# Patient Record
Sex: MALE | Race: WHITE
[De-identification: names, ages, dates, MRNs, and addresses within clinical notes are randomized per-mention and may not be internally consistent; named-entity substitution may affect disease eponyms.]

---

## 2018-04-26 ENCOUNTER — HOSPITAL ENCOUNTER (INPATIENT)
Dept: HOSPITAL 72 - EMR | Age: 83
LOS: 2 days | Discharge: ANOTHER HOSPITAL | DRG: 280 | End: 2018-04-28
Payer: MEDICARE

## 2018-04-26 VITALS — SYSTOLIC BLOOD PRESSURE: 104 MMHG | DIASTOLIC BLOOD PRESSURE: 68 MMHG

## 2018-04-26 VITALS — SYSTOLIC BLOOD PRESSURE: 99 MMHG | DIASTOLIC BLOOD PRESSURE: 58 MMHG

## 2018-04-26 VITALS — SYSTOLIC BLOOD PRESSURE: 97 MMHG | DIASTOLIC BLOOD PRESSURE: 61 MMHG

## 2018-04-26 VITALS — DIASTOLIC BLOOD PRESSURE: 62 MMHG | SYSTOLIC BLOOD PRESSURE: 115 MMHG

## 2018-04-26 VITALS — DIASTOLIC BLOOD PRESSURE: 61 MMHG | SYSTOLIC BLOOD PRESSURE: 101 MMHG

## 2018-04-26 VITALS — WEIGHT: 153 LBS | HEIGHT: 67 IN | BODY MASS INDEX: 24.01 KG/M2

## 2018-04-26 VITALS — SYSTOLIC BLOOD PRESSURE: 95 MMHG | DIASTOLIC BLOOD PRESSURE: 81 MMHG

## 2018-04-26 DIAGNOSIS — I45.10: ICD-10-CM

## 2018-04-26 DIAGNOSIS — I25.5: ICD-10-CM

## 2018-04-26 DIAGNOSIS — I21.9: Primary | ICD-10-CM

## 2018-04-26 DIAGNOSIS — I50.43: ICD-10-CM

## 2018-04-26 DIAGNOSIS — I13.0: ICD-10-CM

## 2018-04-26 DIAGNOSIS — Z88.0: ICD-10-CM

## 2018-04-26 DIAGNOSIS — I25.10: ICD-10-CM

## 2018-04-26 DIAGNOSIS — E44.0: ICD-10-CM

## 2018-04-26 DIAGNOSIS — J96.00: ICD-10-CM

## 2018-04-26 DIAGNOSIS — I25.2: ICD-10-CM

## 2018-04-26 DIAGNOSIS — T50.8X5A: ICD-10-CM

## 2018-04-26 DIAGNOSIS — G30.9: ICD-10-CM

## 2018-04-26 DIAGNOSIS — R57.0: ICD-10-CM

## 2018-04-26 DIAGNOSIS — F02.80: ICD-10-CM

## 2018-04-26 DIAGNOSIS — I48.0: ICD-10-CM

## 2018-04-26 DIAGNOSIS — K72.00: ICD-10-CM

## 2018-04-26 DIAGNOSIS — J18.9: ICD-10-CM

## 2018-04-26 DIAGNOSIS — N17.9: ICD-10-CM

## 2018-04-26 DIAGNOSIS — N18.5: ICD-10-CM

## 2018-04-26 LAB
ADD MANUAL DIFF: YES
ALBUMIN SERPL-MCNC: 3 G/DL (ref 3.4–5)
ALBUMIN/GLOB SERPL: 0.9 {RATIO} (ref 1–2.7)
ALP SERPL-CCNC: 200 U/L (ref 46–116)
ALT SERPL-CCNC: 365 U/L (ref 12–78)
ANION GAP SERPL CALC-SCNC: 20 MMOL/L (ref 5–15)
APPEARANCE UR: (no result)
APTT BLD: 29 SEC (ref 23–33)
APTT PPP: (no result) S
AST SERPL-CCNC: 670 U/L (ref 15–37)
BILIRUB SERPL-MCNC: 0.8 MG/DL (ref 0.2–1)
BUN SERPL-MCNC: 84 MG/DL (ref 7–18)
CALCIUM SERPL-MCNC: 8.7 MG/DL (ref 8.5–10.1)
CHLORIDE SERPL-SCNC: 96 MMOL/L (ref 98–107)
CK MB SERPL-MCNC: 16.2 NG/ML (ref 0–3.6)
CK SERPL-CCNC: 327 U/L (ref 26–308)
CO2 SERPL-SCNC: 17 MMOL/L (ref 21–32)
CREAT SERPL-MCNC: 3.6 MG/DL (ref 0.55–1.3)
ERYTHROCYTE [DISTWIDTH] IN BLOOD BY AUTOMATED COUNT: 15.1 % (ref 11.6–14.8)
GLOBULIN SER-MCNC: 3.4 G/DL
GLUCOSE UR STRIP-MCNC: NEGATIVE MG/DL
HCT VFR BLD CALC: 31.7 % (ref 42–52)
HGB BLD-MCNC: 10.6 G/DL (ref 14.2–18)
INR PPP: 1.3 (ref 0.9–1.1)
KETONES UR QL STRIP: (no result)
LEUKOCYTE ESTERASE UR QL STRIP: (no result)
MCV RBC AUTO: 92 FL (ref 80–99)
NITRITE UR QL STRIP: POSITIVE
PH UR STRIP: 5 [PH] (ref 4.5–8)
PLATELET # BLD: 463 K/UL (ref 150–450)
POTASSIUM SERPL-SCNC: 4.9 MMOL/L (ref 3.5–5.1)
PROT UR QL STRIP: (no result)
RBC # BLD AUTO: 3.45 M/UL (ref 4.7–6.1)
SODIUM SERPL-SCNC: 133 MMOL/L (ref 136–145)
SP GR UR STRIP: 1.02 (ref 1–1.03)
UROBILINOGEN UR-MCNC: 1 MG/DL (ref 0–1)
WBC # BLD AUTO: 17.7 K/UL (ref 4.8–10.8)

## 2018-04-26 PROCEDURE — 84153 ASSAY OF PSA TOTAL: CPT

## 2018-04-26 PROCEDURE — 84484 ASSAY OF TROPONIN QUANT: CPT

## 2018-04-26 PROCEDURE — 5A09457 ASSISTANCE WITH RESPIRATORY VENTILATION, 24-96 CONSECUTIVE HOURS, CONTINUOUS POSITIVE AIRWAY PRESSURE: ICD-10-PCS

## 2018-04-26 PROCEDURE — 84443 ASSAY THYROID STIM HORMONE: CPT

## 2018-04-26 PROCEDURE — 87086 URINE CULTURE/COLONY COUNT: CPT

## 2018-04-26 PROCEDURE — 99291 CRITICAL CARE FIRST HOUR: CPT

## 2018-04-26 PROCEDURE — 82803 BLOOD GASES ANY COMBINATION: CPT

## 2018-04-26 PROCEDURE — 83605 ASSAY OF LACTIC ACID: CPT

## 2018-04-26 PROCEDURE — 85610 PROTHROMBIN TIME: CPT

## 2018-04-26 PROCEDURE — 85025 COMPLETE CBC W/AUTO DIFF WBC: CPT

## 2018-04-26 PROCEDURE — 83036 HEMOGLOBIN GLYCOSYLATED A1C: CPT

## 2018-04-26 PROCEDURE — 93971 EXTREMITY STUDY: CPT

## 2018-04-26 PROCEDURE — 70450 CT HEAD/BRAIN W/O DYE: CPT

## 2018-04-26 PROCEDURE — 80053 COMPREHEN METABOLIC PANEL: CPT

## 2018-04-26 PROCEDURE — 82553 CREATINE MB FRACTION: CPT

## 2018-04-26 PROCEDURE — 85007 BL SMEAR W/DIFF WBC COUNT: CPT

## 2018-04-26 PROCEDURE — 36600 WITHDRAWAL OF ARTERIAL BLOOD: CPT

## 2018-04-26 PROCEDURE — 81003 URINALYSIS AUTO W/O SCOPE: CPT

## 2018-04-26 PROCEDURE — 71045 X-RAY EXAM CHEST 1 VIEW: CPT

## 2018-04-26 PROCEDURE — 84550 ASSAY OF BLOOD/URIC ACID: CPT

## 2018-04-26 PROCEDURE — 83735 ASSAY OF MAGNESIUM: CPT

## 2018-04-26 PROCEDURE — 87081 CULTURE SCREEN ONLY: CPT

## 2018-04-26 PROCEDURE — 82550 ASSAY OF CK (CPK): CPT

## 2018-04-26 PROCEDURE — 84100 ASSAY OF PHOSPHORUS: CPT

## 2018-04-26 PROCEDURE — 80202 ASSAY OF VANCOMYCIN: CPT

## 2018-04-26 PROCEDURE — 36415 COLL VENOUS BLD VENIPUNCTURE: CPT

## 2018-04-26 PROCEDURE — 94664 DEMO&/EVAL PT USE INHALER: CPT

## 2018-04-26 PROCEDURE — 76770 US EXAM ABDO BACK WALL COMP: CPT

## 2018-04-26 PROCEDURE — 94760 N-INVAS EAR/PLS OXIMETRY 1: CPT

## 2018-04-26 PROCEDURE — 82962 GLUCOSE BLOOD TEST: CPT

## 2018-04-26 PROCEDURE — 82575 CREATININE CLEARANCE TEST: CPT

## 2018-04-26 PROCEDURE — 94660 CPAP INITIATION&MGMT: CPT

## 2018-04-26 PROCEDURE — 94640 AIRWAY INHALATION TREATMENT: CPT

## 2018-04-26 PROCEDURE — 83880 ASSAY OF NATRIURETIC PEPTIDE: CPT

## 2018-04-26 PROCEDURE — 87040 BLOOD CULTURE FOR BACTERIA: CPT

## 2018-04-26 PROCEDURE — 93005 ELECTROCARDIOGRAM TRACING: CPT

## 2018-04-26 PROCEDURE — 85730 THROMBOPLASTIN TIME PARTIAL: CPT

## 2018-04-26 RX ADMIN — INSULIN ASPART SCH UNITS: 100 INJECTION, SOLUTION INTRAVENOUS; SUBCUTANEOUS at 21:00

## 2018-04-26 RX ADMIN — IPRATROPIUM BROMIDE AND ALBUTEROL SULFATE SCH ML: .5; 3 SOLUTION RESPIRATORY (INHALATION) at 22:42

## 2018-04-26 RX ADMIN — HEPARIN SODIUM SCH UNITS: 5000 INJECTION INTRAVENOUS; SUBCUTANEOUS at 21:16

## 2018-04-26 NOTE — DIAGNOSTIC IMAGING REPORT
Indication: Shortness of breath

 

Technique: One view of the chest

 

Comparison: none

 

Findings: There is bilateral interstitial and airspace edema versus infiltrates.

There are bilateral pleural effusions. The heart is mildly enlarged. Severe

degenerative changes of both shoulders are noted

 

Impression: Bilateral interstitial and airspace edema, versus infiltrates, and

bilateral pleural effusions

## 2018-04-26 NOTE — DIAGNOSTIC IMAGING REPORT
Indication: Altered mental status

 

Technique: spiral acquisitions obtained through the brain. Angled axial and coronal 5

x 5 mm slices were reconstructed. No IV contrast utilized.  Radiation dose was

minimized using automated exposure control

 

 

Total dose length product 1369.05 mGycm. CTDIvol(s) 70.38 mGy

 

Comparison: none

 

FINDINGS: No acute hemorrhage or edema. No mass effect or midline shift. There is

age-related enlargement of the ventricles and extra axial CSF spaces. There is

periventricular deep white matter ischemic change. Normal gray-white differentiation.

There is evidence of prior bilateral ocular surgery. Visualized sinuses are

unremarkable.  Intact calvarium.

 

IMPRESSION: Chronic and age-related changes. Negative for acute intracranial bleed or

mass effect

 

 

 

The CT scanner at [facility] is accredited by the American College of

Radiology and the scans are performed using protocols designed to limit radiation

exposure to as low as reasonably achievable to attain images of sufficient resolution

adequate for diagnostic evaluation

## 2018-04-26 NOTE — EMERGENCY ROOM REPORT
History of Present Illness


General


Chief Complaint:  Dyspnea/Respdistress


Source:  Medical Record, EMS





Present Illness


HPI


This patient presents from a skilled nursing facility.  He had been admitted to 

[facility] after having an acute MI 2 weeks ago.  He had a very 

complicated hospital course status post his myocardial infarction.  She did 

undergo coronary artery stenting.  He was supposed to undergo a second stent 

but because his hospital course was so complicated, the stent was delayed and 

was not performed.Patient has a history of mild dementia.  He was otherwise a 

well an active 87-year-old.  He suffered an acute MI 2 weeks ago and was 

treated at [facility].  His hospital course was complicated by 

congestive heart failure, atrial fibrillation, and upper GI bleed with profound 

anemia, and acute renal failure.  He presents today from a skilled nursing 

facility for concern of shortness of breath.  The patient is altered and unable 

or unwilling to give a history.  There is no other history available.


Allergies:  


Coded Allergies:  


     PENICILLINS (Verified  Allergy, Unknown, 4/26/18)





Patient History


Past Medical History:  see triage record, HTN, MI, CAD, CHF, AFib, arrhyth, 

ulcer, GERD, GI bleed, dementia, renal disease


Social History:  Denies: smoking, alcohol use, drug use


Reviewed Nursing Documentation:  PMH: Agreed; PSxH: Agreed





Nursing Documentation-PMH


Past Medical History:  No History, Except For





Review of Systems


All Other Systems:  negative except mentioned in HPI





Physical Exam





Vital Signs








  Date Time  Temp Pulse Resp B/P (MAP) Pulse Ox O2 Delivery O2 Flow Rate FiO2


 


4/26/18 14:23 98.1 71 20 93/60 100 Nasal Cannula 4.0 





 98.1       








Sp02 EP Interpretation:  reviewed, normal


General Appearance:  no apparent distress, GCS 15, non-toxic, other - Altered/

confused, frail elderly male


Head:  normocephalic, atraumatic, other - ecchymosis near lateral canthus of R. 

eye


Eyes:  bilateral eye normal inspection, bilateral eye PERRL


ENT:  hearing grossly normal, normal pharynx, no angioedema, normal voice


Neck:  full range of motion, supple, supple/symm/no masses


Respiratory:  chest non-tender, lungs clear, normal breath sounds, no 

respiratory distress, no retraction, no accessory muscle use, speaking full 

sentences


Cardiovascular #1:  regular rate, rhythm, no edema


Gastrointestinal:  normal bowel sounds, non tender, soft, non-distended, no 

guarding, no rebound


Rectal:  deferred


Musculoskeletal:  normal range of motion, swelling - Anasarca


Neurologic:  other - Non focal, grossly normal


Psychiatric:  mood/affect normal


Skin:  warm/dry, well hydrated, other - anasarca, scattered ecchymosis





Medical Decision Making


Diagnostic Impression:  


 Primary Impression:  


 Sepsis


 Additional Impressions:  


 Multisystem organ failure


 Pneumonia


 Elevated troponin


 Renal failure


 Congestive heart failure (CHF)


 Transaminitis


ER Course


This patient presented altered and confused.  On my evaluation, he was not 

short of breath or in any kind of respiratory distress.  He has a complicated 

medical history and a recent complicated hospital course status post an acute 

MI.  He is found to have multisystem organ failure, congestive heart failure 

and pneumonia on chest x-ray.  He was given IV fluids and broad-spectrum 

antibiotics.  He was placed on BiPAP as a precaution to prevent respiratory 

failure given the CHF and the need for fluids and antibiotics.  He also is in 

acute renal failure with a transaminitis.  His blood pressure remained stable 

with systolic blood pressures in the 90s.  Further review the patient's chart 

and this is baseline for him.  This patient has a poor prognosis and is 

critically ill.  He is admitted to the ICU.





This patient is critically ill.  This patient required complex medical decision-

making, aggressive intervention, extensive laboratory workup and monitoring.





Critical care time: 40 minutes.





Laboratory Tests








Test


  4/26/18


15:36 4/26/18


17:30


 


White Blood Count


  17.7 K/UL


(4.8-10.8)  H 


 


 


Red Blood Count


  3.45 M/UL


(4.70-6.10)  L 


 


 


Hemoglobin


  10.6 G/DL


(14.2-18.0)  L 


 


 


Hematocrit


  31.7 %


(42.0-52.0)  L 


 


 


Mean Corpuscular Volume 92 FL (80-99)   


 


Mean Corpuscular Hemoglobin


  30.8 PG


(27.0-31.0) 


 


 


Mean Corpuscular Hemoglobin


Concent 33.5 G/DL


(32.0-36.0) 


 


 


Red Cell Distribution Width


  15.1 %


(11.6-14.8)  H 


 


 


Platelet Count


  463 K/UL


(150-450)  H 


 


 


Mean Platelet Volume


  7.0 FL


(6.5-10.1) 


 


 


Neutrophils (%) (Auto)


  % (45.0-75.0)


  


 


 


Lymphocytes (%) (Auto)


  % (20.0-45.0)


  


 


 


Monocytes (%) (Auto)  % (1.0-10.0)   


 


Eosinophils (%) (Auto)  % (0.0-3.0)   


 


Basophils (%) (Auto)  % (0.0-2.0)   


 


Differential Total Cells


Counted 100  


  


 


 


Neutrophils % (Manual) 89 % (45-75)  H 


 


Lymphocytes % (Manual) 6 % (20-45)  L 


 


Monocytes % (Manual) 5 % (1-10)   


 


Eosinophils % (Manual) 0 % (0-3)   


 


Basophils % (Manual) 0 % (0-2)   


 


Band Neutrophils 0 % (0-8)   


 


Platelet Estimate Increased  H 


 


Platelet Morphology Normal   


 


Red Blood Cell Morphology Normal   


 


Prothrombin Time


  14.0 SEC


(9.30-11.50)  H 


 


 


Prothrombin Time INR


  1.3 (0.9-1.1)


H 


 


 


PTT


  29 SEC (23-33)


  


 


 


Urine Color Brown   


 


Urine Appearance


  Slightly


cloudy 


 


 


Urine pH 5 (4.5-8.0)   


 


Urine Specific Gravity


  1.025


(1.005-1.035) 


 


 


Urine Protein


  2+ (NEGATIVE)


H 


 


 


Urine Glucose (UA)


  Negative


(NEGATIVE) 


 


 


Urine Ketones


  1+ (NEGATIVE)


H 


 


 


Urine Occult Blood


  5+ (NEGATIVE)


H 


 


 


Urine Nitrite


  Positive


(NEGATIVE)  H 


 


 


Urine Bilirubin


  1+ (NEGATIVE)


H 


 


 


Urine Ictotest Negative   


 


Urine Urobilinogen


  1 MG/DL


(0.0-1.0)  H 


 


 


Urine Leukocyte Esterase


  2+ (NEGATIVE)


H 


 


 


Urine RBC


  10-15 /HPF (0


- 0)  H 


 


 


Urine WBC


  5-10 /HPF (0 -


0)  H 


 


 


Urine Squamous Epithelial


Cells None /LPF


(NONE/OCC) 


 


 


Urine Bacteria


  Moderate /HPF


(NONE)  H 


 


 


Urine Fine Granular Casts


  2-4 /LPF


(NONE)  H 


 


 


Sodium Level


  133 MMOL/L


(136-145)  L 


 


 


Potassium Level


  4.9 MMOL/L


(3.5-5.1) 


 


 


Chloride Level


  96 MMOL/L


()  L 


 


 


Carbon Dioxide Level


  17 MMOL/L


(21-32)  L 


 


 


Anion Gap


  20 mmol/L


(5-15)  H 


 


 


Blood Urea Nitrogen


  84 mg/dL


(7-18)  H 


 


 


Creatinine


  3.6 MG/DL


(0.55-1.30)  H 


 


 


Estimate Glomerular


Filtration Rate  mL/min (>60)  


  


 


 


Glucose Level


  120 MG/DL


()  H 


 


 


Lactic Acid Level


  6.30 mmol/L


(0.66-2.22)  H 5.60 mmol/L


(0.66-2.22)  H


 


Calcium Level


  8.7 MG/DL


(8.5-10.1) 


 


 


Total Bilirubin


  0.8 MG/DL


(0.2-1.0) 


 


 


Aspartate Amino Transferase


(AST) 670 U/L


(15-37)  H 


 


 


Alanine Aminotransferase (ALT)


  365 U/L


(12-78)  H 


 


 


Alkaline Phosphatase


  200 U/L


()  H 


 


 


Total Creatine Kinase


  327 U/L


()  H 


 


 


Creatine Kinase MB


  16.2 NG/ML


(0.0-3.6)  H 


 


 


Creatine Kinase MB Relative


Index 4.9  


  


 


 


Troponin I


  0.488 ng/mL


(0.000-0.056) 


 


 


Pro-B-Type Natriuretic Peptide


  > 23239 pg/mL


(0-125)  H 


 


 


Total Protein


  6.4 G/DL


(6.4-8.2) 


 


 


Albumin


  3.0 G/DL


(3.4-5.0)  L 


 


 


Globulin 3.4 g/dL   


 


Albumin/Globulin Ratio


  0.9 (1.0-2.7)


L 


 








EKG Diagnostic Results


Rate:  normal


Rhythm:  NSR


ST Segments:  no acute changes


Other Impression


Qwaves, RBBB.  No comparison available.





Rhythm Strip Diag. Results


EP Interpretation:  yes


Rate:  80's


Rhythm:  NSR, no PVC's, no ectopy





Chest X-Ray Diagnostic Results


Chest X-Ray Diagnostic Results :  


   Chest X-Ray Ordered:  Yes


   # of Views/Limited/Complete:  1 View


   Indication:  Shortness of Breath


   EP Interpretation:  No


   Interpretation:  other


   Impression:  Other - Diffuse bilateral patchy opacities, RLL and RUQ opacity.


   Electronically Signed by:  Patricia





CT/MRI/US Diagnostic Results


CT/MRI/US Diagnostic Results :  


   Imaging Test Ordered:  CT head


   Impression


No acute findings.  See official report.





Last Vital Signs








  Date Time  Temp Pulse Resp B/P (MAP) Pulse Ox O2 Delivery O2 Flow Rate FiO2


 


4/26/18 15:37  80 20   Nasal Cannula 4.0 


 


4/26/18 15:20 98.1   99/58 89   





 98.1       








Disposition:  ADMITTED AS INPATIENT


Condition:  Critical


Referrals:  


BROOKE WHELAN (PCP)











SOLOMON HUNG D.O. Apr 26, 2018 17:48

## 2018-04-27 VITALS — SYSTOLIC BLOOD PRESSURE: 111 MMHG | DIASTOLIC BLOOD PRESSURE: 70 MMHG

## 2018-04-27 VITALS — DIASTOLIC BLOOD PRESSURE: 57 MMHG | SYSTOLIC BLOOD PRESSURE: 101 MMHG

## 2018-04-27 VITALS — DIASTOLIC BLOOD PRESSURE: 57 MMHG | SYSTOLIC BLOOD PRESSURE: 99 MMHG

## 2018-04-27 VITALS — DIASTOLIC BLOOD PRESSURE: 57 MMHG | SYSTOLIC BLOOD PRESSURE: 100 MMHG

## 2018-04-27 VITALS — DIASTOLIC BLOOD PRESSURE: 56 MMHG | SYSTOLIC BLOOD PRESSURE: 90 MMHG

## 2018-04-27 VITALS — DIASTOLIC BLOOD PRESSURE: 61 MMHG | SYSTOLIC BLOOD PRESSURE: 101 MMHG

## 2018-04-27 VITALS — DIASTOLIC BLOOD PRESSURE: 100 MMHG | SYSTOLIC BLOOD PRESSURE: 139 MMHG

## 2018-04-27 VITALS — SYSTOLIC BLOOD PRESSURE: 101 MMHG | DIASTOLIC BLOOD PRESSURE: 55 MMHG

## 2018-04-27 VITALS — DIASTOLIC BLOOD PRESSURE: 56 MMHG | SYSTOLIC BLOOD PRESSURE: 108 MMHG

## 2018-04-27 VITALS — SYSTOLIC BLOOD PRESSURE: 110 MMHG | DIASTOLIC BLOOD PRESSURE: 60 MMHG

## 2018-04-27 VITALS — SYSTOLIC BLOOD PRESSURE: 109 MMHG | DIASTOLIC BLOOD PRESSURE: 65 MMHG

## 2018-04-27 VITALS — DIASTOLIC BLOOD PRESSURE: 60 MMHG | SYSTOLIC BLOOD PRESSURE: 100 MMHG

## 2018-04-27 VITALS — DIASTOLIC BLOOD PRESSURE: 62 MMHG | SYSTOLIC BLOOD PRESSURE: 103 MMHG

## 2018-04-27 VITALS — SYSTOLIC BLOOD PRESSURE: 98 MMHG | DIASTOLIC BLOOD PRESSURE: 63 MMHG

## 2018-04-27 VITALS — SYSTOLIC BLOOD PRESSURE: 98 MMHG | DIASTOLIC BLOOD PRESSURE: 61 MMHG

## 2018-04-27 VITALS — DIASTOLIC BLOOD PRESSURE: 57 MMHG | SYSTOLIC BLOOD PRESSURE: 103 MMHG

## 2018-04-27 VITALS — SYSTOLIC BLOOD PRESSURE: 102 MMHG | DIASTOLIC BLOOD PRESSURE: 61 MMHG

## 2018-04-27 LAB
ADD MANUAL DIFF: YES
ALBUMIN SERPL-MCNC: 2.7 G/DL (ref 3.4–5)
ALBUMIN/GLOB SERPL: 0.8 {RATIO} (ref 1–2.7)
ALP SERPL-CCNC: 191 U/L (ref 46–116)
ALT SERPL-CCNC: 348 U/L (ref 12–78)
ANION GAP SERPL CALC-SCNC: 17 MMOL/L (ref 5–15)
AST SERPL-CCNC: 491 U/L (ref 15–37)
BILIRUB SERPL-MCNC: 0.8 MG/DL (ref 0.2–1)
BUN SERPL-MCNC: 97 MG/DL (ref 7–18)
CALCIUM SERPL-MCNC: 8.4 MG/DL (ref 8.5–10.1)
CHLORIDE SERPL-SCNC: 98 MMOL/L (ref 98–107)
CO2 SERPL-SCNC: 18 MMOL/L (ref 21–32)
CREAT SERPL-MCNC: 4 MG/DL (ref 0.55–1.3)
ERYTHROCYTE [DISTWIDTH] IN BLOOD BY AUTOMATED COUNT: 15.2 % (ref 11.6–14.8)
GLOBULIN SER-MCNC: 3.4 G/DL
HCT VFR BLD CALC: 30.4 % (ref 42–52)
HGB BLD-MCNC: 10.6 G/DL (ref 14.2–18)
MCV RBC AUTO: 91 FL (ref 80–99)
PLATELET # BLD: 363 K/UL (ref 150–450)
POTASSIUM SERPL-SCNC: 4 MMOL/L (ref 3.5–5.1)
RBC # BLD AUTO: 3.35 M/UL (ref 4.7–6.1)
SODIUM SERPL-SCNC: 133 MMOL/L (ref 136–145)
WBC # BLD AUTO: 13 K/UL (ref 4.8–10.8)

## 2018-04-27 RX ADMIN — HEPARIN SODIUM SCH UNITS: 5000 INJECTION INTRAVENOUS; SUBCUTANEOUS at 08:42

## 2018-04-27 RX ADMIN — IPRATROPIUM BROMIDE AND ALBUTEROL SULFATE SCH ML: .5; 3 SOLUTION RESPIRATORY (INHALATION) at 20:20

## 2018-04-27 RX ADMIN — INSULIN ASPART SCH UNITS: 100 INJECTION, SOLUTION INTRAVENOUS; SUBCUTANEOUS at 11:30

## 2018-04-27 RX ADMIN — IPRATROPIUM BROMIDE AND ALBUTEROL SULFATE SCH ML: .5; 3 SOLUTION RESPIRATORY (INHALATION) at 07:28

## 2018-04-27 RX ADMIN — SODIUM CHLORIDE SCH MLS/HR: 0.9 INJECTION INTRAVENOUS at 21:05

## 2018-04-27 RX ADMIN — IPRATROPIUM BROMIDE AND ALBUTEROL SULFATE SCH ML: .5; 3 SOLUTION RESPIRATORY (INHALATION) at 15:12

## 2018-04-27 RX ADMIN — IPRATROPIUM BROMIDE AND ALBUTEROL SULFATE SCH ML: .5; 3 SOLUTION RESPIRATORY (INHALATION) at 11:00

## 2018-04-27 RX ADMIN — INSULIN ASPART SCH UNITS: 100 INJECTION, SOLUTION INTRAVENOUS; SUBCUTANEOUS at 05:52

## 2018-04-27 RX ADMIN — INSULIN ASPART SCH UNITS: 100 INJECTION, SOLUTION INTRAVENOUS; SUBCUTANEOUS at 21:00

## 2018-04-27 RX ADMIN — IPRATROPIUM BROMIDE AND ALBUTEROL SULFATE SCH ML: .5; 3 SOLUTION RESPIRATORY (INHALATION) at 02:55

## 2018-04-27 RX ADMIN — HEPARIN SODIUM SCH UNITS: 5000 INJECTION INTRAVENOUS; SUBCUTANEOUS at 21:06

## 2018-04-27 RX ADMIN — INSULIN ASPART SCH UNITS: 100 INJECTION, SOLUTION INTRAVENOUS; SUBCUTANEOUS at 16:30

## 2018-04-27 NOTE — CONSULTATION
DATE OF CONSULTATION:  04/26/2018



CARDIOLOGY CONSULTATION



CONSULTING PHYSICIAN:  Mike Suggs M.D.



REQUESTING PHYSICIAN:  Erick Gregory M.D.



REASON FOR CONSULTATION:  Acute on chronic congestive heart

failure.



HISTORY OF PRESENT ILLNESS:  This 87-year-old male, who was independent and

active up until several weeks ago, was brought into the emergency room

from a skilled nursing facility with acute shortness of breath.  His

current medical history dates back several weeks ago when he was seen at

an urgent care center, sent home following laboratory draw with

antibiotics and subsequently called for acute admission due to renal

failure and an acute myocardial infarction.  His troponin level was above

100 and he ultimately underwent a complicated coronary artery stent

procedure.  A second stent procedure was deferred due to the deterioration

of his condition, namely, the patient had a renal failure with the

creatinine of 7 that improved to around 2.6 upon his discharge to the

skilled nursing facility.  The patient had atrial fibrillation and

congestive heart failure as well and a GI bleed due to a gastric ulcer.



The patient was at the skilled nursing facility for just one day and was

transferred because of acute decompensation with shortness of breath.



PAST MEDICAL HISTORY:  Coronary artery disease, recent myocardial

infarction, congestive heart failure, atrial fibrillation, hypertension,

gastric ulcer, gastroesophageal reflux, acute on chronic kidney disease,

and cerebrovascular disease with dementia.



ALLERGIES:  Include penicillin.



MEDICATIONS:  Prior to admission, reviewed and reconciled.



SOCIAL HISTORY:  Negative for smoking, alcohol, or substance abuse.



FAMILY HISTORY:  Not contributory.



REVIEW OF SYSTEMS:  The patient's nephew is interviewed as well as the

patient himself.  All systems negative other than detailed data noted

above.



PHYSICAL EXAMINATION:

VITAL SIGNS:  Afebrile.  Blood pressure 93/60, pulse 71, and respirations

20.

HEENT:  Normocephalic and atraumatic.  Conjunctivae pink.  Sclerae are

anicteric.  Oropharynx clear.  Mucous membranes moist.

NECK:  Supple.  Jugular venous pressure grossly elevated.  No accessory

muscle use is noted.

LUNGS:  With bilateral rales and subcostal retractions.

CARDIAC:  Irregularly irregular rhythm.  Normal S1, S2.  A 1/6 systolic

murmur at apex.

ABDOMEN:  Soft and nontender.

EXTREMITIES:  With trace edema.

NEUROLOGIC:  Nonfocal.

SKIN:  Warm and dry.



LABORATORY AND DIAGNOSTIC DATA:  Chest x-ray with pulmonary edema,

interstitial disease.  EKG, sinus rhythm, right bundle-branch block,

anterolateral infarction, nonspecific ST-T change.  Urinalysis with 2+

leukocyte esterase, 5 to 10 white cells.  _____ white count is 17.7,

hemoglobin is 10.6.  Lactic acid 6.3.  Sodium 133, potassium 4.9, bicarb

17, BUN 84, and creatinine 3.6.



IMPRESSION:

1. Critical condition and guarded prognosis.

2. Cardiogenic shock.

3. Acute myocardial ischemia and possible infarction.

4. Acute on chronic systolic and diastolic congestive heart failure.

5. Possible healthcare-acquired pneumonia.

6. Lactic acidosis.

7. Ischemic cardiomyopathy.

8. Recent coronary stenting.

9. Acute on chronic renal failure.

10. History of recent gastrointestinal bleed.



PLAN:

1. BiPAP support.

2. ICU care with cardiac monitoring.

3. Broad-spectrum antibiotics.

4. Diuresis efforts.

5. Continue dual anti-platelet therapy.

6. Titrate antianginal regimen as tolerated by blood pressure.

7. DVT prophylaxis.

8. Stress ulcer prophylaxis.









  ______________________________________________

  Mike Suggs M.D. DR:  DK/PM

D:  04/27/2018 04:58

T:  04/27/2018 08:02

JOB#:  [id number]

CC:

## 2018-04-27 NOTE — HISTORY AND PHYSICAL REPORT
DATE OF ADMISSION:  04/26/2018



HISTORY OF PRESENT ILLNESS:  The patient is an elderly man admitted from

the nursing facility where he was cared for by another physician, who is

not on staff.  He had a prolonged hospital course recently and was

recently discharged from [facility] after an acute myocardial infarction

with troponin over 100 and acute renal failure.  He had a coronary stent

placed.  He had gastrointestinal bleeding, atrial fibrillation, and acute

renal failure.  He recovered sufficiently for discharge to skilled nursing

where he spent only short time and was transferred here because of

respiratory distress.  When he arrived, he was in distress due to

congestive heart failure, acute respiratory failure, and possible

pneumonia and admission was arranged.  He did not have any high fever.  He

can provide no additional history.  I have spoken to the family and

reviewed the records from [facility].



Code status, discussed with family _____ currently Full Code.



PAST MEDICAL HISTORY:  Coronary heart disease, acute on chronic kidney

disease, gastrointestinal bleeding, atrial fibrillation, congestive heart

failure, hypertension, recent MI, gastric ulcer, dementia.



ALLERGIES:  Penicillin.



SOCIAL HISTORY:  He does not drink or smoke.  He is currently living in a

nursing facility.



REVIEW OF SYSTEMS:  Cannot be obtained.



PHYSICAL EXAMINATION:

GENERAL:  The patient is awake and nods his head.  He is on BiPAP mask and

cannot communicate very well.

VITAL SIGNS:  Showed the blood pressure is somewhat low at 90 to 115

systolic.  There is no fever.  He appears cachectic and chronically ill.

The heart rate is about 80 and rhythm appears to be atrial fibrillation.

Respirations are elevated.

SKIN:  Warm and dry.

HEENT:  The head is normocephalic.

NECK:  No jugular vein distention.

CHEST:  Has a few rhonchi.

CARDIAC:  Rhythm is irregular.

ABDOMEN:  Soft and nontender.

EXTREMITIES:  No clubbing, cyanosis, or edema.



LABORATORY AND DIAGNOSTIC DATA:  Chest x-ray shows pulmonary edema and

possible pneumonia.  The white count is elevated at 17,000.  Urinalysis

has a few white cells.  Creatinine is elevated at 4.0, bicarbonate is low

at 18.  Blood gas showed pH of 7.5, pCO2 of 16 with bicarbonate of 12, pO2

is 536.  Natriuretic peptide is elevated over 35,000 and troponin is

elevated at 0.4.  PSA is mildly elevated at 5.7.



IMPRESSION:

1. Acute respiratory failure.

2. Pulmonary _____ with possible extension.

3. Chronic kidney disease with possible acute component.

4. Shock liver with elevated liver enzymes.

5. Coronary heart disease.

6. Dementia.

7. History of recent gastrointestinal bleed.



PLAN:  The patient will be treated with BiPAP as needed as well as diuresis

and antibiotics.  His prognosis is guarded.  I discussed his care with the

nephew today.









  ______________________________________________

  Erick Gregory M.D.





DR:  Eri

D:  04/27/2018 14:02

T:  04/27/2018 18:56

JOB#:  [id number]

CC:  XAVIER Conklin M.D.  ; FAX#:  [phone]

BILL KESSLER M.D.; FAX#:  [phone]

## 2018-04-27 NOTE — DIAGNOSTIC IMAGING REPORT
Indication: Acute renal failure

 

Technique: Grayscale and duplex images of the kidneys, retroperitoneum, and bladder

were obtained.

 

Comparison: none            

 

Findings: Right kidney measures 8.5 cm in length. Left kidney measures 9.6 cm in

length. Both kidneys demonstrate normal echogenicity. No hydronephrosis.  There is a

left upper pole renal cyst.. Normal inferior vena cava. The bladder contains a Hills

catheter. Some debris is seen within the bladder lumen.

 

Impression: Negative for hydronephrosis

 

Hills catheter within the bladder, which is empty. Debris within the bladder lumen,

nonspecific

 

Incidental finding left renal cyst.

## 2018-04-27 NOTE — CONSULTATION
DATE OF CONSULTATION:  04/27/2018



NEPHROLOGY CONSULTATION



CONSULTING PHYSICIAN:  Sae Reardon M.D.



REFERRING PHYSICIAN:  Erick Gregory M.D.



REASON FOR CONSULTATION:  Acute on chronic kidney injury.



HISTORY OF PRESENT ILLNESS:  The patient is an 87-year-old man who was

recently in [facility] with ST-elevation myocardial infarction and cardiac

cath and stenting, congestive heart failure.  He had low ejection fraction

of 30% to 35% with hypokinesis of the basal and mid anterior septal wall.

The patient also had paroxysmal AFib with RVR and upper GI bleed,

hemoptysis, leukocytosis, and SUNDEEP on CKD at [facility].  His creatinine was in

the low to mid 2s and today, it is 4.  He came in to [facility] last

night with acute shortness of breath and respiratory failure.  He was on

BiPAP, now weaned off the BiPAP.  He is in the ICU.  There is history of

hypertension and Alzheimer disease.  The patient cannot contribute

anything to his history.



PAST HISTORY:  The patient is unable to provide.



MEDICATIONS:  On discharge from [facility] include losartan 50 mg daily,

Namenda 10 mg b.i.d., Metoprolol-XL 25 mg half a tablet daily, Protonix 40

mg b.i.d., sodium bicarbonate 650 mg two tablets b.i.d., sucralfate 1 g

q.i.d., ticagrelor 90 mg daily, Anucort-HC suppository p.r.n., melatonin 1

mg daily p.r.n., trazodone 25 mg at bedtime p.r.n.



ALLERGIES:  Apparently to penicillin.



REVIEW OF SYSTEMS:  The patient is unable to provide.



PHYSICAL EXAMINATION:

GENERAL:  The patient is seen in the ICU.  He is alert, chronically

ill-appearing, thin, elderly man.

VITAL SIGNS:  Blood pressure 102/61, pulse 80, respirations 18, O2

saturation 96% on 2 liters, and temperature 98 degrees axillary.

HEAD, EARS, EYES, NOSE, THROAT:  Sclerae are nonicteric.  Ocular motions

intact in all directions.  There is facial ecchymosis.  Oral mucosa

moist.

NECK:  No adenopathy or thyroid enlargement.

LUNGS:  Fine crackles at the bases bilateral.

HEART:  Rhythm is regular.  S1 and S2.  Apical S4.  I hear no murmurs.

ABDOMEN:  Soft.  I am unable to feel liver or spleen.

EXTREMITIES:  No edema.

NEUROLOGIC:  The patient is alert and responsive.  Ocular motions intact in

all directions.  Smile symmetric.  Tongue is midline.  He moves all

extremities.  He is disoriented.



PERTINENT LABORATORY AND DIAGNOSTIC DATA:  On 04/26/2018, BUN 84 and

creatinine 3.6.  Troponin 0.488.  Today, BUN 97, creatinine 4, sodium 133,

potassium 4, chloride 98, and CO2 18.  His lactic acid 2.7 and 4.4.  BNP

greater than 35,000.  Albumin is 2.7.  TSH 1.628.  He had elevated AST,

ALT, and alkaline phosphatase.  His total CK is 327.  Chest x-ray is

consistent with CHF.



IMPRESSION:

1. Chronic kidney disease, likely stage 4 or 5, due to

nephrosclerosis.

2. Acute kidney injury likely secondary to contrast nephropathy from

recent cardiac catheterization, possible contribution of cardiorenal

syndrome with worsening CHF and cardiomyopathy, possible atheroembolic

syndrome as well.

3. CHF, acute on chronic with systolic dysfunction.

4. Alzheimer's.

5. Moderate protein-calorie malnutrition.

6. Elevated liver enzymes likely secondary to CHF.



PLAN:  I would continue gradual diuresis at this time in view of his

respiratory failure and CHF.  We will watch closely and I expect his BUN

and creatinine may rise a bit and then peak.  Hopefully, his acute kidney

injury will resolve with observation.  He may or may not be a candidate

for dialysis, but this will have to be considered closely in view of his

multiple comorbidities and Alzheimer disease.



Thank you so much for allowing me to participate in the care of this

patient.









  ______________________________________________

  Sae Reardon M.D.





DR:  SOFY

D:  04/27/2018 16:46

T:  04/27/2018 19:32

JOB#:  [id number]

CC:

## 2018-04-27 NOTE — HISTORY & PHYSICAL
History and Physical


History & Physicial


dict





resp failure


pul edema


pos pneumonia


CKD/SUNDEEP





called renal and coardiology


disc w nephew


he will disc code status w other family


prognosis guarded











Erick Gregory MD Apr 27, 2018 14:13

## 2018-04-28 VITALS — SYSTOLIC BLOOD PRESSURE: 110 MMHG | DIASTOLIC BLOOD PRESSURE: 67 MMHG

## 2018-04-28 VITALS — SYSTOLIC BLOOD PRESSURE: 101 MMHG | DIASTOLIC BLOOD PRESSURE: 65 MMHG

## 2018-04-28 VITALS — SYSTOLIC BLOOD PRESSURE: 100 MMHG | DIASTOLIC BLOOD PRESSURE: 69 MMHG

## 2018-04-28 VITALS — SYSTOLIC BLOOD PRESSURE: 136 MMHG | DIASTOLIC BLOOD PRESSURE: 89 MMHG

## 2018-04-28 VITALS — SYSTOLIC BLOOD PRESSURE: 94 MMHG | DIASTOLIC BLOOD PRESSURE: 59 MMHG

## 2018-04-28 VITALS — DIASTOLIC BLOOD PRESSURE: 62 MMHG | SYSTOLIC BLOOD PRESSURE: 98 MMHG

## 2018-04-28 VITALS — SYSTOLIC BLOOD PRESSURE: 104 MMHG | DIASTOLIC BLOOD PRESSURE: 62 MMHG

## 2018-04-28 LAB
ADD MANUAL DIFF: YES
ALBUMIN SERPL-MCNC: 2.7 G/DL (ref 3.4–5)
ALBUMIN/GLOB SERPL: 0.8 {RATIO} (ref 1–2.7)
ALP SERPL-CCNC: 213 U/L (ref 46–116)
ALT SERPL-CCNC: 554 U/L (ref 12–78)
ANION GAP SERPL CALC-SCNC: 20 MMOL/L (ref 5–15)
AST SERPL-CCNC: 677 U/L (ref 15–37)
BILIRUB SERPL-MCNC: 1 MG/DL (ref 0.2–1)
BUN SERPL-MCNC: 106 MG/DL (ref 7–18)
CALCIUM SERPL-MCNC: 8.3 MG/DL (ref 8.5–10.1)
CHLORIDE SERPL-SCNC: 96 MMOL/L (ref 98–107)
CO2 SERPL-SCNC: 15 MMOL/L (ref 21–32)
CREAT SERPL-MCNC: 4.1 MG/DL (ref 0.55–1.3)
CREAT SERPL-MCNC: 4.4 MG/DL (ref 0.55–1.3)
ERYTHROCYTE [DISTWIDTH] IN BLOOD BY AUTOMATED COUNT: 14.6 % (ref 11.6–14.8)
GLOBULIN SER-MCNC: 3.3 G/DL
HCT VFR BLD CALC: 29.1 % (ref 42–52)
HGB BLD-MCNC: 10.1 G/DL (ref 14.2–18)
MCV RBC AUTO: 91 FL (ref 80–99)
PHOSPHATE SERPL-MCNC: 7.6 MG/DL (ref 2.5–4.9)
PLATELET # BLD: 364 K/UL (ref 150–450)
POTASSIUM SERPL-SCNC: 4 MMOL/L (ref 3.5–5.1)
RBC # BLD AUTO: 3.18 M/UL (ref 4.7–6.1)
SODIUM SERPL-SCNC: 131 MMOL/L (ref 136–145)
WBC # BLD AUTO: 15.3 K/UL (ref 4.8–10.8)

## 2018-04-28 RX ADMIN — IPRATROPIUM BROMIDE AND ALBUTEROL SULFATE SCH ML: .5; 3 SOLUTION RESPIRATORY (INHALATION) at 06:48

## 2018-04-28 RX ADMIN — INSULIN ASPART SCH UNITS: 100 INJECTION, SOLUTION INTRAVENOUS; SUBCUTANEOUS at 16:27

## 2018-04-28 RX ADMIN — INSULIN ASPART SCH UNITS: 100 INJECTION, SOLUTION INTRAVENOUS; SUBCUTANEOUS at 06:30

## 2018-04-28 RX ADMIN — IPRATROPIUM BROMIDE AND ALBUTEROL SULFATE SCH ML: .5; 3 SOLUTION RESPIRATORY (INHALATION) at 03:02

## 2018-04-28 RX ADMIN — IPRATROPIUM BROMIDE AND ALBUTEROL SULFATE SCH ML: .5; 3 SOLUTION RESPIRATORY (INHALATION) at 00:28

## 2018-04-28 RX ADMIN — INSULIN ASPART SCH UNITS: 100 INJECTION, SOLUTION INTRAVENOUS; SUBCUTANEOUS at 12:32

## 2018-04-28 RX ADMIN — IPRATROPIUM BROMIDE AND ALBUTEROL SULFATE SCH ML: .5; 3 SOLUTION RESPIRATORY (INHALATION) at 14:52

## 2018-04-28 RX ADMIN — SODIUM CHLORIDE SCH MLS/HR: 0.9 INJECTION INTRAVENOUS at 17:20

## 2018-04-28 RX ADMIN — IPRATROPIUM BROMIDE AND ALBUTEROL SULFATE SCH ML: .5; 3 SOLUTION RESPIRATORY (INHALATION) at 10:47

## 2018-04-28 RX ADMIN — IPRATROPIUM BROMIDE AND ALBUTEROL SULFATE SCH ML: .5; 3 SOLUTION RESPIRATORY (INHALATION) at 19:00

## 2018-04-28 RX ADMIN — HEPARIN SODIUM SCH UNITS: 5000 INJECTION INTRAVENOUS; SUBCUTANEOUS at 09:19

## 2018-04-28 NOTE — PULMONOLOGY PROGRESS NOTE
Assessment/Plan


Assessment/Plan


1. Acute respiratory failure.


2. afib 


3. Chronic kidney disease with possible acute component.


4. Shock liver with elevated liver enzymes.


5. Coronary heart disease.


6. Dementia.


7. History of recent gastrointestinal bleed.


8. pna





better today


bipap qhs and prn distress


rate control


nebs as ordered monitor for arrhythmia


titrate o2


wound care


encourage po


awaiting transfer to [facility]





Subjective


Constitutional:  Reports: no symptoms


HEENT:  Repors: no symptoms


Respiratory:  Reports: no symptoms, dry cough


Gastrointestinal/Abdominal:  Reports: no symptoms


Genitourinary:  Reports: no symptoms


Skin:  Reports: no symptoms


Endocrine:  Reports: no symptoms


Allergies:  


Coded Allergies:  


     PENICILLINS (Verified  Allergy, Unknown, 4/26/18)


Subjective


doing well today


no fever


tolerating po


no bleeding


on 2 4 L sats 93


no cp nv 


cough with minimal phlegm





Objective





Last 24 Hour Vital Signs








  Date Time  Temp Pulse Resp B/P (MAP) Pulse Ox O2 Delivery O2 Flow Rate FiO2


 


4/28/18 16:00       4.0 


 


4/28/18 15:29  95      


 


4/28/18 15:07  79 18  92 Nasal Cannula 4.0 36


 


4/28/18 14:59  75 18  92 Nasal Cannula 4.0 36


 


4/28/18 12:00 96.8 93 22 101/65 91 Nasal Cannula 4.0 





 96.8       


 


4/28/18 12:00       4.0 


 


4/28/18 11:50  96      


 


4/28/18 10:58  72 18  92 Nasal Cannula 3.0 32


 


4/28/18 10:48  80 18  91 Nasal Cannula 3.0 32


 


4/28/18 09:22  90  98/62    


 


4/28/18 09:00  90  98/62    


 


4/28/18 08:00 97.7 113 22 94/59 90 Nasal Cannula 2.0 





 97.7       


 


4/28/18 08:00       2.0 


 


4/28/18 08:00  123      


 


4/28/18 06:56  64 18  94 Nasal Cannula 3.0 32


 


4/28/18 06:56      Nasal Cannula 3.0 32


 


4/28/18 06:56     91 Nasal Cannula 3.0 32


 


4/28/18 06:45  79 18  90 Nasal Cannula 2.0 28


 


4/28/18 04:00  96      


 


4/28/18 04:00 97.2 94 25 100/69 96 Nasal Cannula 2.0 





 97.2       


 


4/28/18 03:15  64 18  93 Nasal Cannula 3.0 32


 


4/28/18 03:02  68 18  88 Nasal Cannula 3.0 32


 


4/28/18 01:11  68 22  98 Facial  30


 


4/28/18 00:30        100


 


4/28/18 00:00  105      


 


4/28/18 00:00 97.8 103 20 136/89 100 Bi-pap 100.0 





 97.8       


 


4/27/18 23:45  66 18  96 Nasal Cannula 2.0 28


 


4/27/18 23:35  65 18  91 Nasal Cannula 2.0 28


 


4/27/18 22:30  119  110/70    


 


4/27/18 20:36  85 18  99 Nasal Cannula 2.0 28


 


4/27/18 20:20  59 18  95 Nasal Cannula 2.0 28


 


4/27/18 20:20      Nasal Cannula 2.0 


 


4/27/18 20:00  94      


 


4/27/18 20:00 97.1 90 20 139/100 100 Nasal Cannula 2.0 





 97.1       

















Intake and Output  


 


 4/27/18 4/28/18





 19:00 07:00


 


Intake Total 400 ml 360 ml


 


Output Total 340 ml 350 ml


 


Balance 60 ml 10 ml


 


  


 


Intake Oral 300 ml 150 ml


 


IV Total 100 ml 210 ml


 


Output Urine Total 340 ml 350 ml








HEENT:  normocephalic, atraumatic


Respiratory/Chest:  crackles/rales, rhonchi


Cardiovascular:  regular rhythm, murmur systolic


Abdomen:  soft, non tender, no organomegaly


Skin:  no rash, no lesions


Neurologic/Psychiatric:  no motor/sensory deficits, alert, oriented x 3


Lymphatic:  no neck adenopathy, no groin adenopathy





Microbiology








 Date/Time


Source Procedure


Growth Status


 


 


 4/26/18 15:22


Blood Blood Culture - Preliminary


NO GROWTH AFTER 24 HOURS Resulted


 


 4/26/18 15:10


Blood Blood Culture - Preliminary


NO GROWTH AFTER 24 HOURS Resulted


 


 4/26/18 19:00


Nasal Nares MRSA Culture - Final


NO METHICILLIN RESISTANT STAPH AUREUS... Complete


 


 4/26/18 15:36


Urine,Clean Catch Urine Culture - Final


NO GROWTH AFTER 48 HOURS Complete


 


 4/26/18 19:00


Rectum VRE Culture - Final


NO VANCOMYCIN RESISTANT ENTEROCOCCUS ... Complete








Laboratory Tests


4/27/18 21:00: 


Urine Collection Time 24, Urine Total Volume 750, Urine Creatinine 104, Urine 

Creatinine 24 Hour 780L, Patient Height Inches (Creat Clear) 67, Patient Weight 

Pounds (Creat Clear) 165.99, Creatinine Clearance 12L, Creatinine 4.1H, Estimat 

Glomerular Filtration Rate , Random Vancomycin Level 14.1


4/28/18 06:30: 


Creatinine 4.4H, Estimat Glomerular Filtration Rate , White Blood Count 15.3H, 

Red Blood Count 3.18L, Hemoglobin 10.1L, Hematocrit 29.1L, Mean Corpuscular 

Volume 91, Mean Corpuscular Hemoglobin 31.7H, Mean Corpuscular Hemoglobin 

Concent 34.7, Red Cell Distribution Width 14.6, Platelet Count 364, Mean 

Platelet Volume 7.4, Neutrophils (%) (Auto) , Lymphocytes (%) (Auto) , 

Monocytes (%) (Auto) , Eosinophils (%) (Auto) , Basophils (%) (Auto) , 

Differential Total Cells Counted 100, Neutrophils % (Manual) 91H, Lymphocytes % 

(Manual) 4L, Monocytes % (Manual) 5, Eosinophils % (Manual) 0, Basophils % (

Manual) 0, Band Neutrophils 0, Platelet Estimate Adequate, Platelet Morphology 

Normal, Hypochromasia 1+, Sodium Level 131L, Potassium Level 4.0, Chloride 

Level 96L, Carbon Dioxide Level 15L, Anion Gap 20H, Blood Urea Nitrogen 106H, 

Glucose Level 126H, Uric Acid 15.6H, Calcium Level 8.3L, Phosphorus Level 7.6H, 

Magnesium Level 2.5H, Total Bilirubin 1.0, Aspartate Amino Transf (AST/SGOT) 

677H, Alanine Aminotransferase (ALT/SGPT) 554H, Alkaline Phosphatase 213H, 

Troponin I 0.400H, Total Protein 6.0L, Albumin 2.7L, Globulin 3.3, Albumin/

Globulin Ratio 0.8L





Current Medications








 Medications


  (Trade)  Dose


 Ordered  Sig/Jessie


 Route


 PRN Reason  Start Time


 Stop Time Status Last Admin


Dose Admin


 


 Albuterol/


 Ipratropium


  (Albuterol/


 Ipratropium)  3 ml  Q4HRT


 HHN


   4/27/18 19:00


 5/1/18 22:59  4/28/18 14:52


 


 


 Allopurinol


  (Zyloprim)  200 mg  DAILY


 ORAL


   4/28/18 15:00


 5/28/18 14:59  4/28/18 16:26


 


 


 Amiodarone HCl


  (Cordarone)  200 mg  DAILY


 ORAL


   4/28/18 09:00


 5/27/18 08:59  4/28/18 09:16


 


 


 Aspirin


  (ASA)  81 mg  DAILY


 ORAL


   4/28/18 09:00


 5/27/18 08:59  4/28/18 09:17


 


 


 Cefepime HCl 0.5


 gm/Dextrose  55 ml @ 


 110 mls/hr  Q24H


 IVPB


   4/27/18 18:00


 5/4/18 17:59  4/27/18 21:05


 


 


 Clopidogrel


 Bisulfate


  (Plavix)  75 mg  DAILY


 ORAL


   4/28/18 09:00


 5/27/18 08:59  4/28/18 09:15


 


 


 Dextrose


  (Dextrose 50%)  25 ml  STAT  PRN


 IV


 Hypoglycemia BS 60-69mg/dl  4/27/18 19:16


 5/26/18 19:15   


 


 


 Dextrose


  (Dextrose 50%)  50 ml  STAT  PRN


 IV


 Hypoglycemia BS less than 60mg  4/27/18 19:16


 5/26/18 19:15   


 


 


 Furosemide


  (Lasix)  40 mg  Q8HR


 IV


   4/28/18 22:00


 5/28/18 21:59   


 


 


 Heparin Sodium


  (Porcine)


  (Heparin 5000


 units/ml)  5,000 units  EVERY 12  HOURS


 SUBQ


   4/27/18 21:00


 5/26/18 20:59  4/28/18 09:19


 


 


 Insulin Aspart


  (NovoLOG)    BEFORE MEALS AND  HS


 SUBQ


   4/27/18 21:00


 5/26/18 20:59  4/28/18 16:27


 


 


 Metoprolol


 Succinate


  (Toprol XL)  25 mg  DAILY


 ORAL


   4/28/18 09:00


 5/27/18 08:59   


 


 


 Metronidazole  100 ml @ 


 100 mls/hr  Q8HR


 IVPB


   4/27/18 22:00


 5/3/18 21:59  4/28/18 14:24


 


 


 Pantoprazole


  (Protonix)  40 mg  DAILY


 ORAL


   4/28/18 09:00


 5/27/18 08:59  4/28/18 09:17


 


 


 Vancomycin HCl


  (Vanco rx to


 dose)  1 ea  DAILY  PRN


 MISC


 Per rx protocol  4/27/18 19:17


 5/27/18 19:16   


 

















RAMON EUGENE DO Apr 28, 2018 16:49

## 2018-04-28 NOTE — NEPHROLOGY PROGRESS NOTE
Assessment/Plan


Problem List:  


(1) Hyperuricemia


(2) SUNDEEP (acute kidney injury)


(3) CKD (chronic kidney disease) stage 5, GFR less than 15 ml/min


(4) Congestive heart failure (CHF)


(5) Transaminitis


(6) Multisystem organ failure


(7) Pneumonia


Plan


continue lasix, add allopurinol, repeat cxr  d/w family





Subjective


ROS Limited/Unobtainable:  Yes





Objective


Objective





Last 24 Hour Vital Signs








  Date Time  Temp Pulse Resp B/P (MAP) Pulse Ox O2 Delivery O2 Flow Rate FiO2


 


4/28/18 12:00 96.8 93 22 101/65 91 Nasal Cannula 4.0 





 96.8       


 


4/28/18 12:00       4.0 


 


4/28/18 11:50  96      


 


4/28/18 10:58  72 18  92 Nasal Cannula 3.0 32


 


4/28/18 10:48  80 18  91 Nasal Cannula 3.0 32


 


4/28/18 09:22  90  98/62    


 


4/28/18 09:00  90  98/62    


 


4/28/18 08:00 97.7 113 22 94/59 90 Nasal Cannula 2.0 





 97.7       


 


4/28/18 08:00       2.0 


 


4/28/18 08:00  123      


 


4/28/18 06:56  64 18  94 Nasal Cannula 3.0 32


 


4/28/18 06:56      Nasal Cannula 3.0 32


 


4/28/18 06:56     91 Nasal Cannula 3.0 32


 


4/28/18 06:45  79 18  90 Nasal Cannula 2.0 28


 


4/28/18 04:00  96      


 


4/28/18 04:00 97.2 94 25 100/69 96 Nasal Cannula 2.0 





 97.2       


 


4/28/18 03:15  64 18  93 Nasal Cannula 3.0 32


 


4/28/18 03:02  68 18  88 Nasal Cannula 3.0 32


 


4/28/18 01:11  68 22  98 Facial  30


 


4/28/18 00:30        100


 


4/28/18 00:00  105      


 


4/28/18 00:00 97.8 103 20 136/89 100 Bi-pap 100.0 





 97.8       


 


4/27/18 23:45  66 18  96 Nasal Cannula 2.0 28


 


4/27/18 23:35  65 18  91 Nasal Cannula 2.0 28


 


4/27/18 22:30  119  110/70    


 


4/27/18 20:36  85 18  99 Nasal Cannula 2.0 28


 


4/27/18 20:20  59 18  95 Nasal Cannula 2.0 28


 


4/27/18 20:20      Nasal Cannula 2.0 


 


4/27/18 20:00  94      


 


4/27/18 20:00 97.1 90 20 139/100 100 Nasal Cannula 2.0 





 97.1       


 


4/27/18 16:00 98.0 76 18 103/62 98 Nasal Cannula 2.0 





 98.0       


 


4/27/18 16:00  76      


 


4/27/18 15:23  75 18  98 Nasal Cannula 2.0 28


 


4/27/18 15:12  74 18  95 Nasal Cannula 2.0 28

















Intake and Output  


 


 4/27/18 4/28/18





 19:00 07:00


 


Intake Total 400 ml 360 ml


 


Output Total 340 ml 350 ml


 


Balance 60 ml 10 ml


 


  


 


Intake Oral 300 ml 150 ml


 


IV Total 100 ml 210 ml


 


Output Urine Total 340 ml 350 ml








Laboratory Tests


4/27/18 21:00: 


Urine Collection Time 24, Urine Total Volume 750, Urine Creatinine 104, Urine 

Creatinine 24 Hour 780L, Patient Height Inches (Creat Clear) 67, Patient Weight 

Pounds (Creat Clear) 165.99, Creatinine Clearance 12L, Creatinine 4.1H, Estimat 

Glomerular Filtration Rate , Random Vancomycin Level 14.1


4/28/18 06:30: 


Creatinine 4.4H, Estimat Glomerular Filtration Rate , White Blood Count 15.3H, 

Red Blood Count 3.18L, Hemoglobin 10.1L, Hematocrit 29.1L, Mean Corpuscular 

Volume 91, Mean Corpuscular Hemoglobin 31.7H, Mean Corpuscular Hemoglobin 

Concent 34.7, Red Cell Distribution Width 14.6, Platelet Count 364, Mean 

Platelet Volume 7.4, Neutrophils (%) (Auto) , Lymphocytes (%) (Auto) , 

Monocytes (%) (Auto) , Eosinophils (%) (Auto) , Basophils (%) (Auto) , 

Differential Total Cells Counted 100, Neutrophils % (Manual) 91H, Lymphocytes % 

(Manual) 4L, Monocytes % (Manual) 5, Eosinophils % (Manual) 0, Basophils % (

Manual) 0, Band Neutrophils 0, Platelet Estimate Adequate, Platelet Morphology 

Normal, Hypochromasia 1+, Sodium Level 131L, Potassium Level 4.0, Chloride 

Level 96L, Carbon Dioxide Level 15L, Anion Gap 20H, Blood Urea Nitrogen 106H, 

Glucose Level 126H, Uric Acid 15.6H, Calcium Level 8.3L, Phosphorus Level 7.6H, 

Magnesium Level 2.5H, Total Bilirubin 1.0, Aspartate Amino Transf (AST/SGOT) 

677H, Alanine Aminotransferase (ALT/SGPT) 554H, Alkaline Phosphatase 213H, 

Troponin I 0.400H, Total Protein 6.0L, Albumin 2.7L, Globulin 3.3, Albumin/

Globulin Ratio 0.8L


Height (Feet):  5


Height (Inches):  7.00


Weight (Pounds):  153


General Appearance:  moderate distress


EENT:  other - facial eccymoses


Neck:  normal alignment


Cardiovascular:  regular rhythm, tachycardia


Respiratory/Chest:  accessory muscle use, rhonchi - bilaterally


Abdomen:  non tender, soft, no organomegaly


Extremities:  moderate edema


Neurologic:  CNs II-XII grossly normal











BILL KESSLER Apr 28, 2018 15:09

## 2018-04-28 NOTE — PROGRESS NOTE
DATE:  04/27/2018



CARDIOLOGY PROGRESS NOTE CRITICAL CARE



SUBJECTIVE:  The patient feels slightly better today with less shortness of

breath.  He is more alert and interactive.  He is off BiPAP.  Saturating

on a nasal cannula adequately.



Monitored rhythm is atrial fibrillation with episodes of rapid

ventricular response.



OBJECTIVE:

VITAL SIGNS:  Blood pressure 110/70, heart rate 59 to 119, and respiratory

rate 18 to 20.  He is afebrile.  Oxygen saturation is 91% to 99% on 2

liters nasal cannula.

LUNGS:  Few rales.

HEART:  Irregularly irregular rhythm.  Normal S1, S2.

ABDOMEN:  Soft.

EXTREMITIES:  No edema.



LABORATORY DATA:  White count 13 and hemoglobin 10.6.  BUN 97 and

creatinine 4.  Lactic acid 4.4.  Troponin 0.421.  Urinalysis with 5 to 10

white cells.



IMPRESSION:

1. Remains critical and guarded.

2. Cardiogenic shock.

3. Acute myocardial ischemia and possible infarction.

4. Acute on chronic systolic and diastolic congestive heart failure.

5. Healthcare-acquired pneumonia.

6. Lactic acidosis.

7. Ischemic cardiomyopathy with recent coronary stenting.

8. Acute on chronic renal failure.

9. Recent history of gastrointestinal bleeding.

10. Paroxysmal atrial fibrillation with episodes of rapid ventricular

response.



PLAN:

1. Amiodarone reloading.

2. Titrate beta-blocker.

3. IV Cardizem for episodes of rapid ventricular response.

4. Titrate diuretic dosing.

5. Optimize cardiorenal parameters.

6. No plans for anticoagulation at this time due to increased bleeding

risk in view of recent gastrointestinal bleed.









  ______________________________________________

  Mike Suggs M.D.





DR:  OSITO

D:  04/28/2018 01:52

T:  04/28/2018 02:33

JOB#:  [id number]

CC:

## 2018-04-29 NOTE — CARDIOLOGY REPORT
--------------- APPROVED REPORT --------------





EKG Measurement

Heart [id number]

IN 

[id number]

[id number]

[id number]

[id number]





Normal sinus rhythm

Left axis deviation

Right bundle branch block

Anteroseptal infarct, age undetermined

Abnormal ECG

## 2018-04-29 NOTE — PROGRESS NOTE
DATE:  04/28/2018



CARDIOLOGY PROGRESS NOTE



SUBJECTIVE:  The patient has less shortness of breath.  He is comfortable

on two liters nasal cannula.  The patient's sister is at bedside.  Case

discussed in detail with her and the patient.



OBJECTIVE:

VITAL SIGNS:  Blood pressure 101/65, pulse 93, respirations 22, and

afebrile.

LUNGS:  Coarse breath sounds.  Few rhonchi and rales.

HEART:  Irregularly irregular rhythm.  Normal S1 and S2.

ABDOMEN:  Soft.

EXTREMITIES:  No edema.



LABORATORY DATA:  White count 15, hemoglobin 10.  Sodium 131, potassium 4,

bicarbonate 15, , and creatinine 4.4.  Troponin down to 0.4.  Uric

acid is 15.6.  Albumin 2.7.



IMPRESSION:

1. Acute myocardial infarction.

2. Cardiogenic shock.

3. Pulmonary edema.

4. Acute on chronic renal failure.

5. Hyperuricemia.

6. Transaminitis, likely due to passive congestion.

7. Healthcare-acquired pneumonia, remains here with serious condition

and guarded prognosis, but improved.

8. Paroxysmal atrial fibrillation.

9. Status post gastrointestinal bleed due to gastric ulcer.

10. Status post coronary stenting.



PLAN:  Diuresis with cautious monitoring of cardiorenal parameters.  Family

members made aware that dialysis risk is present.  Continue dual

anti-platelet therapy.  Hold anticoagulation in view of recent gastric

ulcer and bleeding risk.  Increase amiodarone to reload and maintain sinus

rhythm.  He will be transferred to the primary care physician at [facility] with critical care ambulance only.  Discussed with family members.

Continue antimicrobials.









  ______________________________________________

  Mike Suggs M.D.





DR:  Nazia

D:  04/28/2018 21:16

T:  04/29/2018 01:58

JOB#:  [id number]

CC:

## 2018-04-30 NOTE — DISCHARGE SUMMARY
Discharge Summary


DATE OF ADMISSION: 04/26/2018





DATE OF DISCHARGE: 04/28/2018





REASON FOR ADMISSION: 


[] 87 years old male recently sustained acute myocardial infarction and was 

treated at [facility] with placement of coronary stent.'s 

hospital course was complicated by congestive heart failure, atrial fibrillation

, and upper GI bleeding with profound anemia, as well as acute renal failure.  

Patient was supposed to undergo a second stent but because of the complicated 

hospital course this Lasix middle stent was delayed and not performed.  Patient 

after stability patient was transferred to skilled nursing facility for further 

management.  Patient presented to emergency department with shortness of 

breath.  Patient was either and was unable to provide any significant 

information.  Upon evaluation in emergency department CT of the head revealed 

no acute intracranial pathology but shows chronic age-related changes.  X-ray 

revealed bilateral interstitial and airspace edema versus infiltrate, and 

bilateral pleural effusion.  Patient was placed on the BiPAP.  Lactic acid was 

explained 3 WBC 17.7 hemoglobin 10.6 hematocrit 31.7 elevated liver enzymes 

were noted  .  Troponin was elevated 0.488 proBNP was above 35,

000  CK-MB 16.2.  Patient was admitted to [facility] for further management


 


CONSULTANTS:


cardiologist Dr. Calzada


neurologist


pulmonary


ID specialist


GI specialist


nephrologist Dr. Reardon


hematologist/oncologist


surgery


psychiatrist


 


HOSPITAL COURSE: 


Patient was admitted to telemetry.  Patient was on the BiPAP , settings were 

titrated to keep pulse oximetry above 92% ; pulmonary toilet was provided as 

needed.  Patient was started on cautious diuresis with close monitoring of 

cardiorenal parameters and volumes.  Cardiologist closely follow.  Dual 

antiplatelet therapy was resumed .Serial troponin were  positive, but 

anticoagulation was hold due to recent GI bleeding with  severe anemia.  

Antianginal regimen was titrated as tolerated by blood pressure.  Heart rate 

was controlled with  amiodarone, dose was uptitrated.  DVT and GI prophylaxis 

provided.  


Patient eventually was able to be weaned from the BiPAP to oxygen via nasal 

cannula.  


Patient was on antibiotic for possible healthcare associated pneumonia. Follow-

up chest x-ray did not reveal significant improvement.


Patient was afebrile but leukocytosis persisted.





Nephrologist closely followed.  According to nephrologist, patient had chronic 

kidney disease likely stage 4 or 5 due to nephrosclerosis. On this admission he 

presented with acute kidney injury likely secondary to contrast nephropathy 

from recent cardiac catheterization, possible contribution from cardiorenal 

syndrome with worsening congestive heart failure and cardiomyopathy, possible  

atheroembolic syndrome as well. Nephrologist recommended to continue gradual 

diuresis in view of respiratory failure and CHF.  Renal parameters were closely 

monitored.  Electrolytes were corrected as needed.  Creatinine and BUN  with 

some elevation and then peak.  He may or may not be a candidate for dialysis as 

per nephrologist, however,  this should be closely considered with weighing all 

pros and cons in view of multiply comorbidities and Alzheimer dementia. 

Transfer was arranged to [facility] for further management . 

Patient was transferred to higher level of care via [facility].





FINAL DIAGNOSES: 


Acute respiratory failure,improved


Acute myocardial infarction


Cardiogenic shock


Acute on chronic systolic and diastolic congestive heart failure


Possible healthcare associated pneumonia


Chronic kidney disease, likely stage  4 or 5, due to nephrosclerosis.


Acute kidney injury likely secondary to contrast nephropathy from recent 

cardiac catheterization, possible contribution of cardiorenal syndrome  due to 

worsening CHF and cardiomyopathy, possible atheroembolic syndrome   


Ischemic cardiomyopathy 


Lactic acidosis


Coronary artery disease, status post recent MI with the coronary stenting


Paroxysmal atrial fibrillation


Shock liver with elevated liver enzymes


Moderate protein calorie malnutrition


Alzheimer dementia


history of GI bleeding





DISCHARGE MEDICATIONS:


List of medication was sent to the accepting facility 








DISCHARGE INSTRUCTIONS:


Patient was transferred to [facility] via [facility] ambulance for 

further management





I have been assigned to dictate discharge summary for this account. I was not 

involved in the patient's management.











Cate Waggoner NP ([place]) Apr 30, 2018 09:45